# Patient Record
(demographics unavailable — no encounter records)

---

## 2025-05-19 NOTE — REASON FOR VISIT
[Initial Consultation] : an initial consultation for [Mother] : mother [FreeTextEntry3] : History of pulmonary hypertension

## 2025-05-19 NOTE — CONSULT LETTER
[Today's Date] : [unfilled] [Dear  ___:] : Dear Dr. [unfilled]: [Sincerely,] : Sincerely, [Name] : Name: [unfilled] [] : : ~~ [Today's Date:] : [unfilled] [Consult - Multiple Provider] : Thank you very much for allowing us to participate in the care of this patient. If you have any questions, please do not hesitate to contact us. [FreeTextEntry4] : [facility] [FreeTextEntry5] : [phone] [FreeTextEntry6] : [facility] [id number] [de-identified] : Nunu Aguilar, MSN, CPNP AC, PC Pediatric Cardiology Pediatric Heart Transplant and Heart Failure [facility]   Faizan Valle MD Pediatric Cardiologist Director, Pediatric Heart Transplant and Heart Failure [facility]  of [facility] [place] and [facility] [employer] at [facility]

## 2025-05-21 NOTE — REASON FOR VISIT
[Initial Consultation] : an initial consultation for [Mother] : mother [Other: _____] : [unfilled] [FreeTextEntry3] : History of pulmonary hypertension

## 2025-05-21 NOTE — PHYSICAL EXAM
[General Appearance - Alert] : alert [General Appearance - Well Developed] : playful [Sclera] : the sclera were normal [EOMI] : ~T the extraocular movements were intact [Nasal Cavity] : the nasal mucosa was normal [Examination Of The Oral Cavity] : mucous membranes were moist and pink [Respiration, Rhythm And Depth] : normal respiratory rhythm and effort [Apical Impulse] : quiet precordium with normal apical impulse [Heart Rate And Rhythm] : normal heart rate and rhythm [Heart Sounds] : normal S1 and S2 [No Murmur] : no murmurs  [Arterial Pulses] : normal upper and lower extremity pulses with no pulse delay [Heart Sounds Click] : no clicks [Capillary Refill Test] : normal capillary refill [Bowel Sounds] : normal bowel sounds [Abdomen Soft] : soft [Nondistended] : nondistended [Abdomen Tenderness] : non-tender [Nail Clubbing] : no clubbing  or cyanosis of the fingernails [] : no rash [Skin Lesions] : no lesions [Demonstrated Behavior - Infant Nonreactive To Parents] : interactive [FreeTextEntry1] : small for age [FreeTextEntry4] : Tracheostomy in place [FreeTextEntry5] : Good air entry bilaterally, mildly coarse.  [de-identified] : Global developmental delay

## 2025-05-21 NOTE — HISTORY OF PRESENT ILLNESS
[FreeTextEntry1] : James is a [id number], ex 24 week preemie, with previous pulmonary hypertension, s/p Sildenafil.    She was initially treated in our NICU, transferred to [facility] for BPD management, and eventually discharged home. She has had multiple re-admissions since discharge for respiratory exacerbations. Most recently she was discharged from [facility] in late April.  She has a trach and is currently at [place] for rehab and vent weaning. Currently, she is tolerating room air CPAP with saturations %. She is fed via G-Tube and they are condensing feeds. Other medical history: BPD, Hypothyroidism, and PDA (s/p closure w/ Tylenol). She is on no cardiac medications.

## 2025-05-21 NOTE — CARDIOLOGY SUMMARY
[Today's Date] : [unfilled] [FreeTextEntry1] : NSR, ventricular [id number], NSR, possible biventricular hypertrophy [FreeTextEntry2] : Summary: 1. Patient has a tracheostomy. 2. This was a technically difficult study due to patient agitation. 3. {S,D,S\} Situs solitus, D-ventricular looping, normally related great arteries. 4. The left upper pulmonary vein could not be seen on color flow mapping, attempts limited by patient agitation. Some two dimensional images suggest a very tiny possible left upper pulmonary vein under the appendage (color flow not demonstrated in this structure. There is no reversal of flow in the left pulmonary artery. The left lower and two right pulmonary veins were well seen with low velocity laminar flow across them. Consider sedated echo / advanced imaging to clarify the left upper pulmonary vein. Tiny pulmonary venous collateral seen. 5. 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 Suggest clarification in subsequent study. 6. Right ventricular pressure estimate likely <1/2 systemic based on normal systolic configuration of the ventricular septum. Quantification is inadequate. 7. Normal left ventricular size, morphology and systolic function. 8. Normal right ventricular morphology with qualitatively normal size and systolic function. 9. No pericardial effusion

## 2025-05-21 NOTE — PHYSICAL EXAM
[General Appearance - Alert] : alert [General Appearance - Well Developed] : playful [Sclera] : the sclera were normal [EOMI] : ~T the extraocular movements were intact [Nasal Cavity] : the nasal mucosa was normal [Examination Of The Oral Cavity] : mucous membranes were moist and pink [Respiration, Rhythm And Depth] : normal respiratory rhythm and effort [Apical Impulse] : quiet precordium with normal apical impulse [Heart Rate And Rhythm] : normal heart rate and rhythm [Heart Sounds] : normal S1 and S2 [No Murmur] : no murmurs  [Arterial Pulses] : normal upper and lower extremity pulses with no pulse delay [Heart Sounds Click] : no clicks [Capillary Refill Test] : normal capillary refill [Bowel Sounds] : normal bowel sounds [Abdomen Soft] : soft [Nondistended] : nondistended [Abdomen Tenderness] : non-tender [Nail Clubbing] : no clubbing  or cyanosis of the fingernails [] : no rash [Skin Lesions] : no lesions [Demonstrated Behavior - Infant Nonreactive To Parents] : interactive [FreeTextEntry1] : small for age [FreeTextEntry4] : Tracheostomy in place [FreeTextEntry5] : Good air entry bilaterally, mildly coarse.  [de-identified] : Global developmental delay

## 2025-05-21 NOTE — CONSULT LETTER
[Today's Date] : [unfilled] [Name] : Name: [unfilled] [] : : ~~ [Dear  ___:] : Dear Dr. [unfilled]: [Consult - Multiple Provider] : Thank you very much for allowing us to participate in the care of this patient. If you have any questions, please do not hesitate to contact us. [Sincerely,] : Sincerely, [FreeTextEntry4] : [facility] [FreeTextEntry5] : [address] [FreeTextEntry6] : [place] [address] [de-identified] : Nunu Aguilar, MSN, CPNP AC, PC Pediatric Cardiology Pediatric Heart Transplant and Heart Failure [facility]   Faizan Valle MD Pediatric Cardiologist Director, Pediatric Heart Transplant and Heart Failure [facility]  of [facility] [place] and [facility] [employer] [facility] at [facility]

## 2025-05-21 NOTE — HISTORY OF PRESENT ILLNESS
[FreeTextEntry1] : James is a [id number], ex 24 week preemie, with previous pulmonary hypertension, s/p Sildenafil.    She was initially treated in our NICU, transferred to [facility] for BPD management, and eventually discharged home. She has had multiple re-admissions since discharge for respiratory exacerbations. Most recently she was discharged from [facility] in late April.  She has a trach and is currently at [place] for rehab and vent weaning. Currently, she is tolerating room air CPAP with saturations %. She is fed via G-Tube and they are condensing feeds. Other medical history: BPD, Hypothyroidism, and PDA (s/p closure w/ Tylenol). She is on no cardiac medications.

## 2025-05-21 NOTE — CARDIOLOGY SUMMARY
[Today's Date] : [unfilled] [FreeTextEntry1] : NSR, ventricular [id number], NSR, possible biventricular hypertrophy [FreeTextEntry2] : Summary: 1. Patient has a tracheostomy. 2. This was a technically difficult study due to patient agitation. 3. {S,D,S\} Situs solitus, D-ventricular looping, normally related great arteries. 4. The left upper pulmonary vein could not be seen on color flow mapping, attempts limited by patient agitation. Some two dimensional images suggest a very tiny possible left upper pulmonary vein under the appendage (color flow not demonstrated in this structure. There is no reversal of flow in the left pulmonary artery. The left lower and two right pulmonary veins were well seen with low velocity laminar flow across them. Consider sedated echo / advanced imaging to clarify the left upper pulmonary vein. Tiny pulmonary venous collateral seen. 5. 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 Suggest clarification in subsequent study. 6. Right ventricular pressure estimate likely <1/2 systemic based on normal systolic configuration of the ventricular septum. Quantification is inadequate. 7. Normal left ventricular size, morphology and systolic function. 8. Normal right ventricular morphology with qualitatively normal size and systolic function. 9. No pericardial effusion

## 2025-05-21 NOTE — DISCUSSION/SUMMARY
[FreeTextEntry1] : In summary, James is a 17month old, ex 24-week female with trach, BPD, hypothyroid, s/p Sildenafil in  period, s/p PDA closure with Tylenol. She is currently rehabbing at [place] where they are weaning vent and condensing feeds.  Her echocardiogram today demonstrates normal biventricular function, < half systemic RV pressure and no dilation of the right or left ventricle. The left upper pulmonary vein was not visualized, although some images suggest a very small left upper pulmonary vein. We do not have evidence (or documentation) from other institutions that the LUPV has been seen. We spoke at length to the mother and grandmother about obtaining a CT scan to verify the anatomy and if present, though stenotic or there's a short atretic segment may recommend a cardiac cath to attempt to open by balloon or stent if it exists. It is not definitive but is unlikely that this is the cause of her ongoing respiratory issues.   Regardless, we will see her back in 4 months, or sooner to discuss results of testing.  -obtain CT scan with contrast, needs sedation- -follow up in 4 months [Needs SBE Prophylaxis] : [unfilled] does not need bacterial endocarditis prophylaxis [May participate in all age-appropriate activities] : [unfilled] May participate in all age-appropriate activities.

## 2025-05-21 NOTE — CONSULT LETTER
[Today's Date] : [unfilled] [Name] : Name: [unfilled] [] : : ~~ [Dear  ___:] : Dear Dr. [unfilled]: [Consult - Multiple Provider] : Thank you very much for allowing us to participate in the care of this patient. If you have any questions, please do not hesitate to contact us. [Sincerely,] : Sincerely, [FreeTextEntry4] : [facility] [FreeTextEntry5] : [address] [FreeTextEntry6] : [place] [address] [de-identified] : Nunu Aguilar, MSN, CPNP AC, PC Pediatric Cardiology Pediatric Heart Transplant and Heart Failure [facility]   Faizan Valle MD Pediatric Cardiologist Director, Pediatric Heart Transplant and Heart Failure [facility]  of [facility] [place] and [facility] [employer] [facility] at [facility]